# Patient Record
Sex: FEMALE | Race: WHITE | URBAN - METROPOLITAN AREA
[De-identification: names, ages, dates, MRNs, and addresses within clinical notes are randomized per-mention and may not be internally consistent; named-entity substitution may affect disease eponyms.]

---

## 2019-08-08 ENCOUNTER — OFFICE VISIT - RIVER FALLS (OUTPATIENT)
Dept: FAMILY MEDICINE | Facility: CLINIC | Age: 64
End: 2019-08-08

## 2019-08-08 ASSESSMENT — MIFFLIN-ST. JEOR: SCORE: 1309.05

## 2021-06-15 ENCOUNTER — RECORDS - HEALTHEAST (OUTPATIENT)
Dept: ADMINISTRATIVE | Facility: OTHER | Age: 66
End: 2021-06-15

## 2021-06-15 LAB
LAB AP CHARGES (HE HISTORICAL CONVERSION): NORMAL
PATH REPORT.COMMENTS IMP SPEC: NORMAL
PATH REPORT.FINAL DX SPEC: NORMAL
PATH REPORT.GROSS SPEC: NORMAL
PATH REPORT.MICROSCOPIC SPEC OTHER STN: NORMAL
PATH REPORT.RELEVANT HX SPEC: NORMAL
RESULT FLAG (HE HISTORICAL CONVERSION): NORMAL

## 2022-02-12 VITALS
DIASTOLIC BLOOD PRESSURE: 98 MMHG | HEIGHT: 65 IN | HEART RATE: 83 BPM | BODY MASS INDEX: 28.32 KG/M2 | WEIGHT: 170 LBS | SYSTOLIC BLOOD PRESSURE: 144 MMHG

## 2022-02-15 NOTE — NURSING NOTE
Quick Intake Entered On:  8/8/2019 7:59 AM CDT    Performed On:  8/8/2019 7:49 AM CDT by Apryl Byers               Summary   Chief Complaint :   Consult for varicose veins.    Weight Measured :   170 lb(Converted to: 170 lb 0 oz, 77.11 kg)    Height Measured :   64.5 in(Converted to: 5 ft 4 in, 163.83 cm)    Body Mass Index :   28.73 kg/m2 (HI)    Body Surface Area :   1.87 m2   Systolic Blood Pressure :   144 mmHg (HI)    Diastolic Blood Pressure :   98 mmHg (HI)    Mean Arterial Pressure :   113 mmHg   Peripheral Pulse Rate :   83 bpm   Apryl Byers - 8/8/2019 7:49 AM CDT   Health Status   Allergies Verified? :   Yes   Medication History Verified? :   Yes   Medical History Verified? :   Yes   Pre-Visit Planning Status :   Completed   Tobacco Use? :   Never smoker   Apryl Byers - 8/8/2019 7:49 AM CDT   Consents   Consent for Immunization Exchange :   Consent Granted   Consent for Immunizations to Providers :   Consent Granted   Apryl Byers - 8/8/2019 7:49 AM CDT   Meds / Allergies   (As Of: 8/8/2019 7:59:30 AM CDT)   Allergies (Active)   Mold  Estimated Onset Date:   Unspecified ; Created By:   Apryl Byers; Reaction Status:   Active ; Category:   Environment ; Substance:   Mold ; Type:   Allergy ; Severity:   Mild ; Updated By:   Apryl Byers; Source:   Patient ; Reviewed Date:   8/8/2019 7:58 AM CDT        Medication List   (As Of: 8/8/2019 7:59:30 AM CDT)   Home Meds    aluminum hydroxide-magnesium carbonate  :   aluminum hydroxide-magnesium carbonate ; Status:   Documented ; Ordered As Mnemonic:   Gaviscon Extra Strength 160 mg-105 mg oral tablet, chewable ; Simple Display Line:   2 tab(s), Chewed, qidpchs, 0 Refill(s) ; Catalog Code:   aluminum hydroxide-magnesium carbonate ; Order Dt/Tm:   8/8/2019 7:53:14 AM          ascorbic acid  :   ascorbic acid ; Status:   Documented ; Ordered As Mnemonic:   Vitamin C ; Simple Display Line:   daily, 0  Refill(s) ; Catalog Code:   ascorbic acid ; Order Dt/Tm:   8/8/2019 7:53:02 AM          aspirin  :   aspirin ; Status:   Documented ; Ordered As Mnemonic:   aspirin 81 mg oral delayed release tablet ; Simple Display Line:   81 mg, 1 tab(s), Oral, daily, 0 Refill(s) ; Catalog Code:   aspirin ; Order Dt/Tm:   8/8/2019 7:52:51 AM          bacillus coagulans-inulin  :   bacillus coagulans-inulin ; Status:   Documented ; Ordered As Mnemonic:   Probiotic Formula (Bacillus Coagulans) ; Simple Display Line:   Oral, daily, 0 Refill(s) ; Catalog Code:   bacillus coagulans-inulin ; Order Dt/Tm:   8/8/2019 7:51:32 AM          calcium carbonate  :   calcium carbonate ; Status:   Documented ; Ordered As Mnemonic:   calcium (as carbonate) 600 mg oral tablet ; Simple Display Line:   1,200 mg, 2 tab(s), Oral, tid, PRN: for control of stomach acid, 60 tab(s), 0 Refill(s) ; Catalog Code:   calcium carbonate ; Order Dt/Tm:   8/8/2019 7:52:28 AM          chondroitin-glucosamine  :   chondroitin-glucosamine ; Status:   Documented ; Ordered As Mnemonic:   Cosamin  mg-400 mg oral capsule ; Simple Display Line:   0 Refill(s) ; Catalog Code:   chondroitin-glucosamine ; Order Dt/Tm:   8/8/2019 7:53:24 AM          docusate  :   docusate ; Status:   Documented ; Ordered As Mnemonic:   docusate sodium ; Simple Display Line:   0 Refill(s) ; Catalog Code:   docusate ; Order Dt/Tm:   8/8/2019 7:53:37 AM          fluticasone nasal  :   fluticasone nasal ; Status:   Documented ; Ordered As Mnemonic:   Flonase 50 mcg/inh nasal spray ; Simple Display Line:   Nasal, daily, 0 Refill(s) ; Catalog Code:   fluticasone nasal ; Order Dt/Tm:   8/8/2019 7:53:44 AM          garlic  :   garlic ; Status:   Documented ; Ordered As Mnemonic:   garlic ; Simple Display Line:   0 Refill(s) ; Catalog Code:   garlic ; Order Dt/Tm:   8/8/2019 7:53:31 AM          levothyroxine  :   levothyroxine ; Status:   Documented ; Ordered As Mnemonic:   levothyroxine 100 mcg  (0.1 mg) oral tablet ; Simple Display Line:   100 mcg, 1 tab(s), Oral, daily, 0 Refill(s) ; Catalog Code:   levothyroxine ; Order Dt/Tm:   8/8/2019 7:51:03 AM          lisinopril  :   lisinopril ; Status:   Documented ; Ordered As Mnemonic:   lisinopril 20 mg oral tablet ; Simple Display Line:   20 mg, 1 tab(s), Oral, daily, 0 Refill(s) ; Catalog Code:   lisinopril ; Order Dt/Tm:   8/8/2019 7:50:28 AM          loratadine-pseudoephedrine  :   loratadine-pseudoephedrine ; Status:   Documented ; Ordered As Mnemonic:   Claritin-D 24 Hour ; Simple Display Line:   Oral, daily, 0 Refill(s) ; Catalog Code:   loratadine-pseudoephedrine ; Order Dt/Tm:   8/8/2019 7:50:41 AM          metFORMIN  :   metFORMIN ; Status:   Documented ; Ordered As Mnemonic:   metFORMIN 500 mg oral tablet ; Simple Display Line:   1,000 mg, 2 tab(s), Oral, 0 Refill(s) ; Catalog Code:   metFORMIN ; Order Dt/Tm:   8/8/2019 7:52:08 AM          methylcellulose  :   methylcellulose ; Status:   Documented ; Ordered As Mnemonic:   Citrucel 2 g/19 g oral powder for reconstitution ; Simple Display Line:   2 gm, Oral, tid, 0 Refill(s) ; Catalog Code:   methylcellulose ; Order Dt/Tm:   8/8/2019 7:52:38 AM          multivitamin with minerals  :   multivitamin with minerals ; Status:   Documented ; Ordered As Mnemonic:   multivitamin with minerals (w/ Iron) ; Simple Display Line:   0 Refill(s) ; Catalog Code:   multivitamin with minerals ; Order Dt/Tm:   8/8/2019 7:51:53 AM          omega-3 polyunsaturated fatty acids  :   omega-3 polyunsaturated fatty acids ; Status:   Documented ; Ordered As Mnemonic:   [employer]'s B[employer] Red Krill Oil ; Simple Display Line:   1,000 mg, Oral, bid, 0 Refill(s) ; Catalog Code:   omega-3 polyunsaturated fatty acids ; Order Dt/Tm:   8/8/2019 7:51:12 AM          pravastatin  :   pravastatin ; Status:   Documented ; Ordered As Mnemonic:   pravastatin ; Simple Display Line:   Oral, daily, 0 Refill(s) ; Catalog Code:   pravastatin ; Order  Dt/Tm:   8/8/2019 7:51:43 AM